# Patient Record
Sex: FEMALE | HISPANIC OR LATINO | Employment: FULL TIME | URBAN - METROPOLITAN AREA
[De-identification: names, ages, dates, MRNs, and addresses within clinical notes are randomized per-mention and may not be internally consistent; named-entity substitution may affect disease eponyms.]

---

## 2022-08-01 ENCOUNTER — OFFICE VISIT (OUTPATIENT)
Dept: GASTROENTEROLOGY | Facility: CLINIC | Age: 22
End: 2022-08-01

## 2022-08-01 ENCOUNTER — HOSPITAL ENCOUNTER (OUTPATIENT)
Dept: GENERAL RADIOLOGY | Facility: HOSPITAL | Age: 22
Discharge: HOME | End: 2022-08-01
Admitting: INTERNAL MEDICINE

## 2022-08-01 VITALS
TEMPERATURE: 97.3 F | WEIGHT: 140.1 LBS | DIASTOLIC BLOOD PRESSURE: 88 MMHG | SYSTOLIC BLOOD PRESSURE: 126 MMHG | HEIGHT: 61 IN | BODY MASS INDEX: 26.45 KG/M2

## 2022-08-01 DIAGNOSIS — R14.0 BLOATING: Primary | ICD-10-CM

## 2022-08-01 DIAGNOSIS — R14.0 BLOATING: ICD-10-CM

## 2022-08-01 PROCEDURE — 99204 OFFICE O/P NEW MOD 45 MIN: CPT | Performed by: INTERNAL MEDICINE

## 2022-08-01 PROCEDURE — 74018 RADEX ABDOMEN 1 VIEW: CPT

## 2022-08-01 NOTE — PROGRESS NOTES
Chief Complaint   Patient presents with   • Abdominal Pain     Subjective   HPI  Merry Aguilar is a 22 y.o. female who presents today for new patient evaluation.  She complains of lower abdominal discomfort.  Sensation of heaviness and bloating.  Occurs intermittently she states she is had the symptoms off and on since childhood.  She is tried periods of exclusion of different foods with no real change in her symptoms.  She denies any change in bowel habit she has a normal bowel movement once or twice a day with no blood or mucus.  Recently seen in the urgent care for the symptoms was diagnosed with acute cystitis and prescribed antibiotics without change in her symptoms.      Objective   Vitals:    08/01/22 1459   BP: 126/88   Temp: 97.3 °F (36.3 °C)     Physical Exam  Vitals reviewed.   Constitutional:       Appearance: She is well-developed.   HENT:      Head: Normocephalic and atraumatic.   Abdominal:      General: There is no distension.      Palpations: Abdomen is soft.      Tenderness: There is no abdominal tenderness.   Neurological:      Mental Status: She is alert and oriented to person, place, and time.   Psychiatric:         Behavior: Behavior normal.         Thought Content: Thought content normal.         Judgment: Judgment normal.              Assessment & Plan   Assessment:     1. Bloating      Plan:   22-year-old female with longstanding lower abdominal bloating without alarm symptoms.  We will check a KUB to assess stool burden although by history she does not describe constipation.  We will check a celiac panel.  Prescribe Levsin sublingual as needed.  Follow-up in 4 to 6 weeks.          Jose Perry M.D.  [facility]  [address] [place] [address]  Office: [phone]

## 2022-08-02 LAB
ENDOMYSIUM IGA SER QL: NEGATIVE
GLIADIN PEPTIDE IGA SER-ACNC: 5 UNITS (ref 0–19)
GLIADIN PEPTIDE IGG SER-ACNC: 2 UNITS (ref 0–19)
IGA SERPL-MCNC: 303 MG/DL (ref 87–352)
TTG IGA SER-ACNC: <2 U/ML (ref 0–3)
TTG IGG SER-ACNC: 5 U/ML (ref 0–5)

## 2022-08-02 NOTE — PROGRESS NOTES
KUB shows stool in R colon, moderate amount.  I recommend Miralax 17gm/day  If this is not effective then we can consider low dose Linzess.    ? Gallstone vs kidney stone noted.  Please schedule RUQ u/s to evaluate further.

## 2022-08-05 ENCOUNTER — TELEPHONE (OUTPATIENT)
Dept: GASTROENTEROLOGY | Facility: CLINIC | Age: 22
End: 2022-08-05

## 2022-08-05 NOTE — TELEPHONE ENCOUNTER
----- Message from Jose Perry MD sent at 8/2/2022  7:30 AM EDT -----  KUB shows stool in R colon, moderate amount.  I recommend Miralax 17gm/day  If this is not effective then we can consider low dose Linzess.    ? Gallstone vs kidney stone noted.  Please schedule RUQ u/s to evaluate further.

## 2022-08-08 ENCOUNTER — TELEPHONE (OUTPATIENT)
Dept: GASTROENTEROLOGY | Facility: CLINIC | Age: 22
End: 2022-08-08

## 2022-08-08 DIAGNOSIS — R10.11 RIGHT UPPER QUADRANT ABDOMINAL PAIN: Primary | ICD-10-CM

## 2022-08-08 DIAGNOSIS — K59.00 CONSTIPATION, UNSPECIFIED CONSTIPATION TYPE: ICD-10-CM

## 2022-08-08 NOTE — TELEPHONE ENCOUNTER
Caller: Cas Aguilra    Relationship: Self    Best call back number: [phone]    What is the best time to reach you: ANY    Who are you requesting to speak with (clinical staff, provider,  specific staff member): BREANN    Do you know the name of the person who called:CAS    What was the call regarding: PT RETURNING A CALL FROM BREANN    Do you require a callback: YES

## 2022-08-08 NOTE — TELEPHONE ENCOUNTER
Called pt and advised of Dr Perry's note.  Pt verbalized understanding.     Order placed for RUQ US.  Msg sent to Dr Perry to cosign.

## 2022-08-12 ENCOUNTER — TELEPHONE (OUTPATIENT)
Dept: GASTROENTEROLOGY | Facility: CLINIC | Age: 22
End: 2022-08-12

## 2022-08-16 ENCOUNTER — HOSPITAL ENCOUNTER (OUTPATIENT)
Dept: ULTRASOUND IMAGING | Facility: HOSPITAL | Age: 22
Discharge: HOME | End: 2022-08-16
Admitting: INTERNAL MEDICINE

## 2022-08-16 DIAGNOSIS — R10.11 RIGHT UPPER QUADRANT ABDOMINAL PAIN: ICD-10-CM

## 2022-08-16 DIAGNOSIS — K59.00 CONSTIPATION, UNSPECIFIED CONSTIPATION TYPE: ICD-10-CM

## 2022-08-16 PROCEDURE — 76705 ECHO EXAM OF ABDOMEN: CPT

## 2022-09-08 ENCOUNTER — TELEPHONE (OUTPATIENT)
Dept: GASTROENTEROLOGY | Facility: CLINIC | Age: 22
End: 2022-09-08

## 2022-09-08 NOTE — TELEPHONE ENCOUNTER
Called pt and left vm to call back.     Also advised will send pt a MyChart msg with results and to call back if any questions.

## 2022-09-08 NOTE — TELEPHONE ENCOUNTER
----- Message from Jose Perry MD sent at 8/18/2022  2:12 PM EDT -----  No e/o gallstones on U/S  She should f/u with PCP regarding the possible kidney stone on KUB

## 2022-09-12 ENCOUNTER — OFFICE VISIT (OUTPATIENT)
Dept: GASTROENTEROLOGY | Facility: CLINIC | Age: 22
End: 2022-09-12

## 2022-09-12 VITALS
DIASTOLIC BLOOD PRESSURE: 74 MMHG | HEIGHT: 61 IN | SYSTOLIC BLOOD PRESSURE: 114 MMHG | BODY MASS INDEX: 27 KG/M2 | TEMPERATURE: 96.9 F | WEIGHT: 143 LBS | HEART RATE: 71 BPM

## 2022-09-12 DIAGNOSIS — K59.09 CHRONIC CONSTIPATION: ICD-10-CM

## 2022-09-12 DIAGNOSIS — R10.30 LOWER ABDOMINAL PAIN: Primary | ICD-10-CM

## 2022-09-12 DIAGNOSIS — N20.0 KIDNEY STONE: ICD-10-CM

## 2022-09-12 PROCEDURE — 99213 OFFICE O/P EST LOW 20 MIN: CPT | Performed by: PHYSICIAN ASSISTANT

## 2022-09-12 NOTE — PROGRESS NOTES
"Chief Complaint  Constipation and Abdominal Pain    Subjective          History of Present Illness    Merry Aguilar is a  22 y.o. female presents for follow-up on lower abdominal discomfort and constipation.  She is a patient of Dr. Perry last seen on 8/1/2022.  She is new to me.    She has lower abdominal discomfort associated with heaviness and bloating.  She reports bowel movements every 2 to 3 days.  She was found to have right-sided moderate stool burden on KUB.  She was to start MiraLAX 1 dose daily.  She took this for a week and it really helped her symptoms but she was not sure if she was to stay on this or not.  She is not taking currently.  Denies blood in stool, melena, nocturnal stools, nausea, vomiting, or weight loss.     Has tried changes to food with no improvement.  She was previously vegan and did notice a change in her bowel habits when she went to eating meat.    8/16/2022 gallbladder ultrasound showed no e/o gallstones on U/S  She should f/u with PCP regarding the possible kidney stone on KUB    8/1/2022 KUB shows stool in R colon, moderate amount.  I recommend Miralax 17gm/day  If this is not effective then we can consider low dose Linzess.    She had negative celiac panel on 8/1/2022.    No [facility]. Social drinker. No tobacco use.     Objective   Vital Signs:   /74   Pulse 71   Temp 96.9 °F (36.1 °C)   Ht 154.9 cm (61\")   Wt 64.9 kg (143 lb)   BMI 27.02 kg/m²       Physical Exam  Vitals reviewed.   Constitutional:       General: She is awake. She is not in acute distress.     Appearance: Normal appearance. She is well-developed and well-groomed.   HENT:      Head: Normocephalic.   Pulmonary:      Effort: Pulmonary effort is normal. No respiratory distress.   Abdominal:      General: Abdomen is flat. Bowel sounds are normal. There is no distension.      Palpations: Abdomen is soft. There is no hepatomegaly, splenomegaly or mass.      Tenderness: There is abdominal tenderness in the " right lower quadrant. There is no guarding or rebound.   Skin:     Coloration: Skin is not pale.   Neurological:      Mental Status: She is alert and oriented to person, place, and time.      Gait: Gait is intact.   Psychiatric:         Mood and Affect: Mood and affect normal.         Speech: Speech normal.         Behavior: Behavior is cooperative.         Judgment: Judgment normal.          Result Review :             Assessment and Plan    Diagnoses and all orders for this visit:    1. Lower abdominal pain (Primary)    2. Chronic constipation    3. Kidney stone    I feel her symptoms are likely related to her constipation.  She does drink plenty of fluids and exercises regularly.  Recommend she start a fiber supplement daily.  I gave her a handout on this.  If this is not improving her bowel movements in a couple weeks, she will add MiraLAX back in daily as this worked well for her last time.  She does not have any warning signs so hold off on endoscopies at this time.    We did discuss her kidney stone finding.  She does not have a PCP.  The stone is not obstructing her ureter at this time although we did discuss that this would likely require an emergency department visit if it did pass into her ureter.  Size was not mentioned on ultrasound.  We discussed referral to nephrologist.  She requested that I give her the nephrologist information and she will consider calling for evaluation.    Follow Up   Return in about 4 weeks (around 10/10/2022).    Dragon dictation used throughout this note.     Julia Zamora PA-C

## 2022-11-29 DIAGNOSIS — R19.7 DIARRHEA, UNSPECIFIED TYPE: ICD-10-CM

## 2022-11-29 DIAGNOSIS — R14.0 BLOATING: ICD-10-CM

## 2022-11-29 DIAGNOSIS — K59.01 SLOW TRANSIT CONSTIPATION: ICD-10-CM

## 2022-11-29 DIAGNOSIS — R10.84 GENERALIZED ABDOMINAL PAIN: Primary | ICD-10-CM

## 2022-12-08 ENCOUNTER — HOSPITAL ENCOUNTER (OUTPATIENT)
Dept: GENERAL RADIOLOGY | Facility: HOSPITAL | Age: 22
Discharge: HOME | End: 2022-12-08
Admitting: PHYSICIAN ASSISTANT

## 2022-12-08 DIAGNOSIS — K59.01 SLOW TRANSIT CONSTIPATION: ICD-10-CM

## 2022-12-08 DIAGNOSIS — R10.84 GENERALIZED ABDOMINAL PAIN: ICD-10-CM

## 2022-12-08 DIAGNOSIS — R19.7 DIARRHEA, UNSPECIFIED TYPE: ICD-10-CM

## 2022-12-08 DIAGNOSIS — R14.0 BLOATING: ICD-10-CM

## 2022-12-08 PROCEDURE — 74018 RADEX ABDOMEN 1 VIEW: CPT

## 2022-12-12 ENCOUNTER — TELEPHONE (OUTPATIENT)
Dept: GASTROENTEROLOGY | Facility: CLINIC | Age: 22
End: 2022-12-12

## 2022-12-12 DIAGNOSIS — R10.84 GENERALIZED ABDOMINAL PAIN: ICD-10-CM

## 2022-12-12 DIAGNOSIS — R10.30 LOWER ABDOMINAL PAIN: ICD-10-CM

## 2022-12-12 DIAGNOSIS — R19.7 DIARRHEA, UNSPECIFIED TYPE: Primary | ICD-10-CM

## 2022-12-12 NOTE — TELEPHONE ENCOUNTER
I called and spoke to patient.  She reports persistent diarrhea with days in between.  Lower abdominal discomfort.  X-ray shows persistent right-sided stool burden which is what August x-ray showed.  She is taking fiber and MiraLAX daily without improvement.  Recommend proceeding with CT scan.  Order placed.

## 2022-12-12 NOTE — TELEPHONE ENCOUNTER
----- Message from Chelo Tobin RN sent at 12/12/2022  9:48 AM EST -----  Regarding: FW: Symptoms reoccurring   Contact: [phone]    ----- Message -----  From: Merry Aguilar  Sent: 12/7/2022  11:03 AM EST  To: [facility] Clinical Pool  Subject: Symptoms reoccurring                             Okay thank you im sorry I missed the call im at work currently. Im still having issues I have not had a regular bowl movement it’s only diarrhea or nothing at all. And very little amount at that. I will try to make it to take the X-ray after work tomorrow. Thank you so much for your help

## 2023-01-07 ENCOUNTER — APPOINTMENT (OUTPATIENT)
Dept: CT IMAGING | Facility: HOSPITAL | Age: 23
End: 2023-01-07
Payer: COMMERCIAL

## 2023-02-13 ENCOUNTER — HOSPITAL ENCOUNTER (OUTPATIENT)
Dept: CT IMAGING | Facility: HOSPITAL | Age: 23
Discharge: HOME | End: 2023-02-13
Admitting: PHYSICIAN ASSISTANT
Payer: COMMERCIAL

## 2023-02-13 DIAGNOSIS — R19.7 DIARRHEA, UNSPECIFIED TYPE: ICD-10-CM

## 2023-02-13 DIAGNOSIS — R10.30 LOWER ABDOMINAL PAIN: ICD-10-CM

## 2023-02-13 PROCEDURE — 0 DIATRIZOATE MEGLUMINE & SODIUM PER 1 ML: Performed by: PHYSICIAN ASSISTANT

## 2023-02-13 PROCEDURE — 74177 CT ABD & PELVIS W/CONTRAST: CPT

## 2023-02-13 PROCEDURE — 25010000002 IOPAMIDOL 61 % SOLUTION: Performed by: PHYSICIAN ASSISTANT

## 2023-02-13 RX ADMIN — DIATRIZOATE MEGLUMINE AND DIATRIZOATE SODIUM 30 ML: 660; 100 LIQUID ORAL; RECTAL at 17:33

## 2023-02-13 RX ADMIN — IOPAMIDOL 85 ML: 612 INJECTION, SOLUTION INTRAVENOUS at 17:33
